# Patient Record
Sex: MALE | Race: BLACK OR AFRICAN AMERICAN | Employment: FULL TIME | URBAN - METROPOLITAN AREA
[De-identification: names, ages, dates, MRNs, and addresses within clinical notes are randomized per-mention and may not be internally consistent; named-entity substitution may affect disease eponyms.]

---

## 2022-03-19 PROBLEM — J45.909 ASTHMA: Status: ACTIVE | Noted: 2017-10-26

## 2022-03-19 PROBLEM — L30.9 ECZEMA: Status: ACTIVE | Noted: 2017-10-26

## 2022-08-22 ENCOUNTER — OFFICE VISIT (OUTPATIENT)
Dept: OCCUPATIONAL MEDICINE | Age: 37
End: 2022-08-22

## 2022-08-22 DIAGNOSIS — H00.015 HORDEOLUM EXTERNUM OF LEFT LOWER EYELID: Primary | ICD-10-CM

## 2022-08-22 PROCEDURE — 99213 OFFICE O/P EST LOW 20 MIN: CPT | Performed by: NURSE PRACTITIONER

## 2022-08-22 RX ORDER — ERYTHROMYCIN 5 MG/G
OINTMENT OPHTHALMIC
Qty: 1 G | Refills: 0 | Status: SHIPPED | OUTPATIENT
Start: 2022-08-22 | End: 2022-09-01

## 2022-08-25 ASSESSMENT — ENCOUNTER SYMPTOMS
VOMITING: 0
EYE PAIN: 1
SORE THROAT: 0
NAUSEA: 0
COUGH: 0
SHORTNESS OF BREATH: 0
EYE REDNESS: 1

## 2022-08-25 NOTE — PROGRESS NOTES
PROGRESS NOTE    SUBJECTIVE:   Dana Martin is a 43 y.o. male seen for a follow up visit regarding Stye     The patient is a 43 y.o. male who is seen for evaluation of eye itching and tearing. Symptoms have been present for approximately 2 days and are show no change. Associated symptoms include: none. Has tried warm compressed and improved slightly. Past Medical History, Past Surgical History, Family history, Social History, and Medications were all reviewed with the patient today and updated as necessary. Current Outpatient Medications   Medication Sig Dispense Refill    erythromycin (ROMYCIN) 5 MG/GM ophthalmic ointment Use on affected eye up to 6x/day 1 g 0    fluticasone (FLONASE) 50 MCG/ACT nasal spray 2 sprays by Each Nostril route daily 16 g 5     No current facility-administered medications for this visit. Not on File  Patient Active Problem List   Diagnosis    Asthma    Eczema     Past Medical History:   Diagnosis Date    Asthma 10/26/2017    Eczema 10/26/2017     No past surgical history on file. No family history on file. Social History     Tobacco Use    Smoking status: Unknown    Smokeless tobacco: Not on file   Substance Use Topics    Alcohol use: No       Review of Systems   Constitutional:  Negative for chills and fatigue. HENT:  Negative for congestion, ear pain and sore throat. Eyes:  Positive for pain and redness. Respiratory:  Negative for cough and shortness of breath. Gastrointestinal:  Negative for nausea and vomiting. Musculoskeletal:  Negative for myalgias. Skin:  Negative for rash. Neurological:  Negative for headaches. OBJECTIVE:  There were no vitals taken for this visit. Physical Exam  Constitutional:       Appearance: Normal appearance. Eyes:     Cardiovascular:      Rate and Rhythm: Normal rate and regular rhythm. Pulmonary:      Effort: Pulmonary effort is normal.      Breath sounds: Normal breath sounds.    Neurological:      Mental Status: He is alert. ASSESSMENT and PLAN    Darrian Marshall was seen today for stye. Diagnoses and all orders for this visit:    Hordeolum externum of left lower eyelid    Other orders  -     erythromycin (ROMYCIN) 5 MG/GM ophthalmic ointment; Use on affected eye up to 6x/day      Warm compresses, use ointment. If no improvement in 7 days RTC. Reviewed triggesr, prevention and hygeine.

## 2022-08-29 ENCOUNTER — TELEPHONE (OUTPATIENT)
Dept: OCCUPATIONAL MEDICINE | Age: 37
End: 2022-08-29

## 2022-08-29 RX ORDER — IPRATROPIUM BROMIDE AND ALBUTEROL SULFATE 2.5; .5 MG/3ML; MG/3ML
3 SOLUTION RESPIRATORY (INHALATION) EVERY 4 HOURS PRN
COMMUNITY
Start: 2022-01-12

## 2022-08-29 RX ORDER — ONDANSETRON 4 MG/1
4 TABLET, FILM COATED ORAL DAILY PRN
Qty: 30 TABLET | Refills: 0 | Status: SHIPPED | OUTPATIENT
Start: 2022-08-29

## 2022-08-29 RX ORDER — DICYCLOMINE HYDROCHLORIDE 10 MG/1
10 CAPSULE ORAL 4 TIMES DAILY
Qty: 30 CAPSULE | Refills: 0 | Status: SHIPPED | OUTPATIENT
Start: 2022-08-29

## 2022-08-29 NOTE — TELEPHONE ENCOUNTER
Pt reports having GI bug. Having spasms and vomiting. Reports started yesterday am and continuing today. Educated on [place], take PRN meds. No improvement RTC.

## 2022-10-19 ENCOUNTER — HOSPITAL ENCOUNTER (OUTPATIENT)
Dept: GENERAL RADIOLOGY | Age: 37
Discharge: HOME | End: 2022-10-21
Payer: COMMERCIAL

## 2022-10-19 ENCOUNTER — OFFICE VISIT (OUTPATIENT)
Dept: ORTHOPEDIC SURGERY | Age: 37
End: 2022-10-19
Payer: COMMERCIAL

## 2022-10-19 DIAGNOSIS — S80.01XA CONTUSION OF RIGHT KNEE, INITIAL ENCOUNTER: Primary | ICD-10-CM

## 2022-10-19 DIAGNOSIS — M25.561 RIGHT KNEE PAIN, UNSPECIFIED CHRONICITY: ICD-10-CM

## 2022-10-19 DIAGNOSIS — M25.561 ACUTE PAIN OF RIGHT KNEE: ICD-10-CM

## 2022-10-19 PROCEDURE — 73564 X-RAY EXAM KNEE 4 OR MORE: CPT | Performed by: STUDENT IN AN ORGANIZED HEALTH CARE EDUCATION/TRAINING PROGRAM

## 2022-10-19 PROCEDURE — 99203 OFFICE O/P NEW LOW 30 MIN: CPT | Performed by: STUDENT IN AN ORGANIZED HEALTH CARE EDUCATION/TRAINING PROGRAM
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
## 2022-10-25 ENCOUNTER — OFFICE VISIT (OUTPATIENT)
Dept: OCCUPATIONAL MEDICINE | Age: 37
End: 2022-10-25

## 2022-10-25 DIAGNOSIS — R05.1 ACUTE COUGH: Primary | ICD-10-CM

## 2022-10-25 RX ORDER — GUAIFENESIN AND CODEINE PHOSPHATE 100; 10 MG/5ML; MG/5ML
5 SOLUTION ORAL
Qty: 120 ML | Refills: 0 | Status: SHIPPED | OUTPATIENT
Start: 2022-10-25 | End: 2022-11-08

## 2022-10-29 ASSESSMENT — ENCOUNTER SYMPTOMS
SORE THROAT: 1
COUGH: 0
VOMITING: 0
SHORTNESS OF BREATH: 0
NAUSEA: 0

## 2022-10-29 NOTE — PROGRESS NOTES
PROGRESS NOTE    SUBJECTIVE:   Rosalva Rodriguez is a 43 y.o. male seen for a follow up visit regarding Cough     Patient complains of congestion, cough described as non-productive, without wheezing, dyspnea or hemoptysis, nasal congestion, and post nasal drip. Onset of symptoms was several days ago, gradually worsening since that time. Treatment to date: saline spray/neti pot , antihistamines, albuterol inhaler, nebulized albuterol, inhaled steroid/beta agonist.     Past Medical History, Past Surgical History, Family history, Social History, and Medications were all reviewed with the patient today and updated as necessary. Current Outpatient Medications   Medication Sig Dispense Refill    guaiFENesin-codeine (TUSSI-ORGANIDIN NR) 100-10 MG/5ML syrup Take 5 mLs by mouth nightly as needed for Cough for up to 14 days. 120 mL 0    dicyclomine (BENTYL) 10 MG capsule Take 1 capsule by mouth 4 times daily 30 capsule 0    ondansetron (ZOFRAN) 4 MG tablet Take 1 tablet by mouth daily as needed for Nausea or Vomiting 30 tablet 0    ipratropium-albuterol (DUONEB) 0.5-2.5 (3) MG/3ML SOLN nebulizer solution Inhale 3 mLs into the lungs every 4 hours as needed      fluticasone (FLONASE) 50 MCG/ACT nasal spray 2 sprays by Each Nostril route daily 16 g 5     No current facility-administered medications for this visit. Not on File  Patient Active Problem List   Diagnosis    Asthma    Eczema     Past Medical History:   Diagnosis Date    Asthma 10/26/2017    Eczema 10/26/2017     No past surgical history on file. No family history on file. Social History     Tobacco Use    Smoking status: Unknown    Smokeless tobacco: Not on file   Substance Use Topics    Alcohol use: No       Review of Systems   Constitutional:  Negative for chills and fatigue. HENT:  Positive for congestion, ear pain and sore throat. Respiratory:  Negative for cough and shortness of breath.     Gastrointestinal:  Negative for nausea and vomiting. Musculoskeletal:  Negative for myalgias. Skin:  Negative for rash. Neurological:  Negative for headaches. OBJECTIVE:  There were no vitals taken for this visit. Physical Exam  Constitutional:       Appearance: Normal appearance. HENT:      Head: Normocephalic. Right Ear: Hearing and external ear normal. Swelling and tenderness present. A middle ear effusion is present. Left Ear: Hearing and external ear normal. Swelling and tenderness present. A middle ear effusion is present. Nose: Mucosal edema, congestion and rhinorrhea present. Rhinorrhea is clear. Cardiovascular:      Rate and Rhythm: Normal rate and regular rhythm. Pulmonary:      Effort: Pulmonary effort is normal.      Breath sounds: Normal breath sounds. Neurological:      Mental Status: He is alert. ASSESSMENT and PLAN    Radhika Harding was seen today for cough. Diagnoses and all orders for this visit:    Acute cough  -     guaiFENesin-codeine (TUSSI-ORGANIDIN NR) 100-10 MG/5ML syrup; Take 5 mLs by mouth nightly as needed for Cough for up to 14 days. Continue with allergy meds,inhalers, tylenol, allergy meds. Take PRN cough syrup. No improvement or worsening RTC.